# Patient Record
Sex: MALE | Race: BLACK OR AFRICAN AMERICAN | NOT HISPANIC OR LATINO | Employment: FULL TIME | URBAN - METROPOLITAN AREA
[De-identification: names, ages, dates, MRNs, and addresses within clinical notes are randomized per-mention and may not be internally consistent; named-entity substitution may affect disease eponyms.]

---

## 2017-11-26 ENCOUNTER — APPOINTMENT (EMERGENCY)
Dept: CT IMAGING | Facility: HOSPITAL | Age: 52
End: 2017-11-26
Payer: COMMERCIAL

## 2017-11-26 ENCOUNTER — HOSPITAL ENCOUNTER (EMERGENCY)
Facility: HOSPITAL | Age: 52
Discharge: HOME | End: 2017-11-26
Payer: COMMERCIAL

## 2017-11-26 ENCOUNTER — APPOINTMENT (EMERGENCY)
Dept: RADIOLOGY | Facility: HOSPITAL | Age: 52
End: 2017-11-26
Payer: COMMERCIAL

## 2017-11-26 VITALS
SYSTOLIC BLOOD PRESSURE: 126 MMHG | TEMPERATURE: 99.4 F | RESPIRATION RATE: 16 BRPM | OXYGEN SATURATION: 99 % | HEART RATE: 96 BPM | WEIGHT: 304.24 LBS | DIASTOLIC BLOOD PRESSURE: 60 MMHG

## 2017-11-26 DIAGNOSIS — R74.01 TRANSAMINITIS: ICD-10-CM

## 2017-11-26 DIAGNOSIS — M89.9 LYTIC LESION OF BONE ON X-RAY: ICD-10-CM

## 2017-11-26 DIAGNOSIS — J18.9 PNEUMONIA OF RIGHT LOWER LOBE DUE TO INFECTIOUS ORGANISM: Primary | ICD-10-CM

## 2017-11-26 LAB
ALBUMIN SERPL BCP-MCNC: 2.3 G/DL (ref 3.5–5)
ALP SERPL-CCNC: 122 U/L (ref 46–116)
ALT SERPL W P-5'-P-CCNC: 667 U/L (ref 12–78)
ANION GAP SERPL CALCULATED.3IONS-SCNC: 11 MMOL/L (ref 4–13)
AST SERPL W P-5'-P-CCNC: 480 U/L (ref 5–45)
ATRIAL RATE: 112 BPM
BACTERIA UR QL AUTO: ABNORMAL /HPF
BASOPHILS # BLD AUTO: 0.02 THOUSANDS/ΜL (ref 0–0.1)
BASOPHILS NFR BLD AUTO: 0 % (ref 0–1)
BILIRUB SERPL-MCNC: 1.7 MG/DL (ref 0.2–1)
BILIRUB UR QL STRIP: ABNORMAL
BUN SERPL-MCNC: 16 MG/DL (ref 5–25)
CALCIUM SERPL-MCNC: 8.6 MG/DL (ref 8.3–10.1)
CHLORIDE SERPL-SCNC: 99 MMOL/L (ref 100–108)
CLARITY UR: CLEAR
CO2 SERPL-SCNC: 24 MMOL/L (ref 21–32)
COLOR UR: ABNORMAL
CREAT SERPL-MCNC: 1.56 MG/DL (ref 0.6–1.3)
EOSINOPHIL # BLD AUTO: 0.01 THOUSAND/ΜL (ref 0–0.61)
EOSINOPHIL NFR BLD AUTO: 0 % (ref 0–6)
ERYTHROCYTE [DISTWIDTH] IN BLOOD BY AUTOMATED COUNT: 13.3 % (ref 11.6–15.1)
GFR SERPL CREATININE-BSD FRML MDRD: 58 ML/MIN/1.73SQ M
GLUCOSE SERPL-MCNC: 167 MG/DL (ref 65–140)
GLUCOSE UR STRIP-MCNC: NEGATIVE MG/DL
HCT VFR BLD AUTO: 33.5 % (ref 36.5–49.3)
HGB BLD-MCNC: 11.3 G/DL (ref 12–17)
HGB UR QL STRIP.AUTO: ABNORMAL
KETONES UR STRIP-MCNC: ABNORMAL MG/DL
LACTATE SERPL-SCNC: 1.3 MMOL/L (ref 0.5–2)
LEUKOCYTE ESTERASE UR QL STRIP: NEGATIVE
LYMPHOCYTES # BLD AUTO: 1.05 THOUSANDS/ΜL (ref 0.6–4.47)
LYMPHOCYTES NFR BLD AUTO: 8 % (ref 14–44)
MCH RBC QN AUTO: 30.2 PG (ref 26.8–34.3)
MCHC RBC AUTO-ENTMCNC: 33.7 G/DL (ref 31.4–37.4)
MCV RBC AUTO: 90 FL (ref 82–98)
MONOCYTES # BLD AUTO: 1.21 THOUSAND/ΜL (ref 0.17–1.22)
MONOCYTES NFR BLD AUTO: 9 % (ref 4–12)
NEUTROPHILS # BLD AUTO: 10.37 THOUSANDS/ΜL (ref 1.85–7.62)
NEUTS SEG NFR BLD AUTO: 80 % (ref 43–75)
NITRITE UR QL STRIP: NEGATIVE
NON-SQ EPI CELLS URNS QL MICRO: ABNORMAL /HPF
NRBC BLD AUTO-RTO: 0 /100 WBCS
P AXIS: 50 DEGREES
PH UR STRIP.AUTO: 6 [PH] (ref 4.5–8)
PLATELET # BLD AUTO: 192 THOUSANDS/UL (ref 149–390)
PMV BLD AUTO: 9.9 FL (ref 8.9–12.7)
POTASSIUM SERPL-SCNC: 3.5 MMOL/L (ref 3.5–5.3)
PR INTERVAL: 156 MS
PROT SERPL-MCNC: 7.1 G/DL (ref 6.4–8.2)
PROT UR STRIP-MCNC: >=300 MG/DL
QRS AXIS: 42 DEGREES
QRSD INTERVAL: 110 MS
QT INTERVAL: 342 MS
QTC INTERVAL: 466 MS
RBC # BLD AUTO: 3.74 MILLION/UL (ref 3.88–5.62)
RBC #/AREA URNS AUTO: ABNORMAL /HPF
SODIUM SERPL-SCNC: 134 MMOL/L (ref 136–145)
SP GR UR STRIP.AUTO: 1.02 (ref 1–1.03)
T WAVE AXIS: 60 DEGREES
TROPONIN I SERPL-MCNC: <0.02 NG/ML
UROBILINOGEN UR QL STRIP.AUTO: >=8 E.U./DL
VENTRICULAR RATE: 112 BPM
WBC # BLD AUTO: 12.94 THOUSAND/UL (ref 4.31–10.16)
WBC #/AREA URNS AUTO: ABNORMAL /HPF

## 2017-11-26 PROCEDURE — 96361 HYDRATE IV INFUSION ADD-ON: CPT

## 2017-11-26 PROCEDURE — 83605 ASSAY OF LACTIC ACID: CPT | Performed by: NURSE PRACTITIONER

## 2017-11-26 PROCEDURE — 71020 HB CHEST X-RAY 2VW FRONTAL&LATL: CPT

## 2017-11-26 PROCEDURE — 85025 COMPLETE CBC W/AUTO DIFF WBC: CPT

## 2017-11-26 PROCEDURE — 74177 CT ABD & PELVIS W/CONTRAST: CPT

## 2017-11-26 PROCEDURE — 84484 ASSAY OF TROPONIN QUANT: CPT

## 2017-11-26 PROCEDURE — 80053 COMPREHEN METABOLIC PANEL: CPT

## 2017-11-26 PROCEDURE — 71260 CT THORAX DX C+: CPT

## 2017-11-26 PROCEDURE — 81001 URINALYSIS AUTO W/SCOPE: CPT | Performed by: NURSE PRACTITIONER

## 2017-11-26 PROCEDURE — 93005 ELECTROCARDIOGRAM TRACING: CPT

## 2017-11-26 PROCEDURE — 36415 COLL VENOUS BLD VENIPUNCTURE: CPT

## 2017-11-26 PROCEDURE — 96374 THER/PROPH/DIAG INJ IV PUSH: CPT

## 2017-11-26 PROCEDURE — 99285 EMERGENCY DEPT VISIT HI MDM: CPT

## 2017-11-26 PROCEDURE — 96375 TX/PRO/DX INJ NEW DRUG ADDON: CPT

## 2017-11-26 RX ORDER — ACETAMINOPHEN 325 MG/1
975 TABLET ORAL ONCE
Status: COMPLETED | OUTPATIENT
Start: 2017-11-26 | End: 2017-11-26

## 2017-11-26 RX ORDER — MORPHINE SULFATE 4 MG/ML
4 INJECTION, SOLUTION INTRAMUSCULAR; INTRAVENOUS ONCE
Status: COMPLETED | OUTPATIENT
Start: 2017-11-26 | End: 2017-11-26

## 2017-11-26 RX ORDER — DOXYCYCLINE HYCLATE 100 MG/1
100 CAPSULE ORAL ONCE
Status: DISCONTINUED | OUTPATIENT
Start: 2017-11-26 | End: 2017-11-26 | Stop reason: HOSPADM

## 2017-11-26 RX ORDER — DOXYCYCLINE HYCLATE 100 MG/1
100 CAPSULE ORAL 2 TIMES DAILY
Qty: 20 CAPSULE | Refills: 0 | Status: SHIPPED | OUTPATIENT
Start: 2017-11-26 | End: 2017-12-06

## 2017-11-26 RX ORDER — ONDANSETRON 2 MG/ML
4 INJECTION INTRAMUSCULAR; INTRAVENOUS ONCE
Status: COMPLETED | OUTPATIENT
Start: 2017-11-26 | End: 2017-11-26

## 2017-11-26 RX ADMIN — MORPHINE SULFATE 4 MG: 4 INJECTION, SOLUTION INTRAMUSCULAR; INTRAVENOUS at 11:59

## 2017-11-26 RX ADMIN — SODIUM CHLORIDE 1000 ML: 0.9 INJECTION, SOLUTION INTRAVENOUS at 11:49

## 2017-11-26 RX ADMIN — ONDANSETRON 4 MG: 2 INJECTION INTRAMUSCULAR; INTRAVENOUS at 11:56

## 2017-11-26 RX ADMIN — IOHEXOL 100 ML: 350 INJECTION, SOLUTION INTRAVENOUS at 13:09

## 2017-11-26 RX ADMIN — ACETAMINOPHEN 975 MG: 325 TABLET ORAL at 11:52

## 2017-11-26 NOTE — ED PROVIDER NOTES
History  Chief Complaint   Patient presents with    Chest Pain     patient presents to the ED with c/o chest pain and cough x1 week      50-year-old male patient presenting to the emergency department with a chief complaint of cough for about a week  He is complaining of right upper abdominal pain and left upper abdominal pain  He is reporting nausea but no actual vomiting or diarrhea  Does not appear toxic  He is maintaining oral intake  None       Past Medical History:   Diagnosis Date    Asthma        History reviewed  No pertinent surgical history  History reviewed  No pertinent family history  I have reviewed and agree with the history as documented  Social History   Substance Use Topics    Smoking status: Former Smoker    Smokeless tobacco: Not on file    Alcohol use No        Review of Systems   Constitutional: Negative for diaphoresis, fatigue and fever  HENT: Negative for congestion, ear pain, nosebleeds and sore throat  Eyes: Negative for photophobia, pain, discharge and visual disturbance  Respiratory: Positive for cough  Negative for choking, chest tightness, shortness of breath and wheezing  Cardiovascular: Negative for chest pain and palpitations  Gastrointestinal: Positive for abdominal pain  Negative for abdominal distention, diarrhea and vomiting  Genitourinary: Negative for dysuria, flank pain and frequency  Musculoskeletal: Negative for back pain, gait problem and joint swelling  Skin: Negative for color change and rash  Neurological: Negative for dizziness, syncope and headaches  Psychiatric/Behavioral: Negative for behavioral problems and confusion  The patient is not nervous/anxious  All other systems reviewed and are negative        Physical Exam  ED Triage Vitals   Temperature Pulse Respirations Blood Pressure SpO2   11/26/17 1023 11/26/17 1018 11/26/17 1018 11/26/17 1018 11/26/17 1018   99 7 °F (37 6 °C) (!) 113 18 132/61 95 %      Temp Source Heart Rate Source Patient Position - Orthostatic VS BP Location FiO2 (%)   11/26/17 1333 11/26/17 1333 11/26/17 1333 11/26/17 1333 --   Oral Monitor Lying Right arm       Pain Score       11/26/17 1152       7           Orthostatic Vital Signs  Vitals:    11/26/17 1100 11/26/17 1245 11/26/17 1300 11/26/17 1333   BP:    126/60   Pulse: (!) 108 100 97 96   Patient Position - Orthostatic VS:    Lying       Physical Exam   Constitutional: He is oriented to person, place, and time  He appears well-developed and well-nourished  HENT:   Head: Normocephalic and atraumatic  Eyes: Pupils are equal, round, and reactive to light  Neck: Normal range of motion  Neck supple  Cardiovascular: Normal rate, regular rhythm, normal heart sounds and normal pulses  PMI is not displaced  Pulmonary/Chest: Effort normal  No apnea  No respiratory distress  He has no decreased breath sounds  He has rhonchi  Abdominal: Soft  He exhibits no distension  There is no guarding  Musculoskeletal: Normal range of motion  Lymphadenopathy:     He has no cervical adenopathy  Neurological: He is alert and oriented to person, place, and time  Skin: Skin is warm and dry  No rash noted  He is not diaphoretic  No pallor  Psychiatric: He has a normal mood and affect  Vitals reviewed        ED Medications  Medications   doxycycline hyclate (VIBRAMYCIN) capsule 100 mg (not administered)   sodium chloride 0 9 % bolus 1,000 mL (0 mL Intravenous Stopped 11/26/17 1333)   acetaminophen (TYLENOL) tablet 975 mg (975 mg Oral Given 11/26/17 1152)   morphine (PF) 4 mg/mL injection 4 mg (4 mg Intravenous Given 11/26/17 1159)   ondansetron (ZOFRAN) injection 4 mg (4 mg Intravenous Given 11/26/17 1156)   iohexol (OMNIPAQUE) 350 MG/ML injection (MULTI-DOSE) 100 mL (100 mL Intravenous Given 11/26/17 1309)       Diagnostic Studies  Results Reviewed     Procedure Component Value Units Date/Time    Urine Microscopic [[id number]]  (Abnormal) Collected: 11/26/17 1406    Lab Status:  Final result Specimen:  Urine from Urine, Clean Catch Updated:  11/26/17 1435     RBC, UA 10-20 (A) /hpf      WBC, UA 0-1 (A) /hpf      Epithelial Cells Occasional /hpf      Bacteria, UA Occasional /hpf     UA w Reflex to Microscopic w Reflex to Culture [[id number]]  (Abnormal) Collected:  11/26/17 1406    Lab Status:  Final result Specimen:  Urine from Urine, Clean Catch Updated:  11/26/17 1416     Color, UA Orange     Clarity, UA Clear     Specific Gravity, UA 1 025     pH, UA 6 0     Leukocytes, UA Negative     Nitrite, UA Negative     Protein, UA >=300 (A) mg/dl      Glucose, UA Negative mg/dl      Ketones, UA Trace (A) mg/dl      Urobilinogen, UA >=8 0 (A) E U /dl      Bilirubin, UA Moderate (A)     Blood, UA Moderate (A)    Lactic acid, plasma [[id number]]  (Normal) Collected:  11/26/17 1124    Lab Status:  Final result Specimen:  Blood from Arm, Right Updated:  11/26/17 1158     LACTIC ACID 1 3 mmol/L     Narrative:         Result may be elevated if tourniquet was used during collection  Troponin I [[id number]]  (Normal) Collected:  11/26/17 1125    Lab Status:  Final result Specimen:  Blood from Arm, Right Updated:  11/26/17 1152     Troponin I <0 02 ng/mL     Narrative:         [employer] Chemistry analyzer 99% cutoff is > 0 04 ng/mL in network labs    o cTnI 99% cutoff is useful only when applied to patients in the clinical setting of myocardial ischemia  o cTnI 99% cutoff should be interpreted in the context of clinical history, ECG findings and possibly cardiac imaging to establish correct diagnosis  o cTnI 99% cutoff may be suggestive but clearly not indicative of a coronary event without the clinical setting of myocardial ischemia      Comprehensive metabolic panel [[id number]]  (Abnormal) Collected:  11/26/17 1125    Lab Status:  Final result Specimen:  Blood from Arm, Right Updated:  11/26/17 1149     Sodium 134 (L) mmol/L      Potassium 3 5 mmol/L      Chloride 99 (L) mmol/L CO2 24 mmol/L      Anion Gap 11 mmol/L      BUN 16 mg/dL      Creatinine 1 56 (H) mg/dL      Glucose 167 (H) mg/dL      Calcium 8 6 mg/dL       (H) U/L       (H) U/L      Alkaline Phosphatase 122 (H) U/L      Total Protein 7 1 g/dL      Albumin 2 3 (L) g/dL      Total Bilirubin 1 70 (H) mg/dL      eGFR 58 ml/min/1 73sq m     Narrative:         National Kidney Disease Education Program recommendations are as follows:  GFR calculation is accurate only with a steady state creatinine  Chronic Kidney disease less than 60 ml/min/1 73 sq  meters  Kidney failure less than 15 ml/min/1 73 sq  meters  CBC and differential [[id number]]  (Abnormal) Collected:  11/26/17 1125    Lab Status:  Final result Specimen:  Blood from Arm, Right Updated:  11/26/17 1135     WBC 12 94 (H) Thousand/uL      RBC 3 74 (L) Million/uL      Hemoglobin 11 3 (L) g/dL      Hematocrit 33 5 (L) %      MCV 90 fL      MCH 30 2 pg      MCHC 33 7 g/dL      RDW 13 3 %      MPV 9 9 fL      Platelets 819 Thousands/uL      nRBC 0 /100 WBCs      Neutrophils Relative 80 (H) %      Lymphocytes Relative 8 (L) %      Monocytes Relative 9 %      Eosinophils Relative 0 %      Basophils Relative 0 %      Neutrophils Absolute 10 37 (H) Thousands/µL      Lymphocytes Absolute 1 05 Thousands/µL      Monocytes Absolute 1 21 Thousand/µL      Eosinophils Absolute 0 01 Thousand/µL      Basophils Absolute 0 02 Thousands/µL                  CT chest abdomen pelvis w contrast   Final Result by Mercedez Jones MD (11/26 8536)      No acute intra-abdominal pelvic abnormality identified  Patchy airspace infiltrate and consolidation throughout the RIGHT lower lobe most consistent with appearance of pneumonia  No pleural fluid collections  Reactive mediastinal and hilar adenopathy  Low suspicion lytic lesion within the RIGHT ischium  See comment                    Workstation performed: [id number]         X-ray chest 2 views   ED Interpretation by Mata Adam Holly Cooper (11/26 1106)   Right lower lobe pneumonia                 Procedures  Procedures       Phone Contacts  ED Phone Contact    ED Course  ED Course                                MDM  Number of Diagnoses or Management Options  Lytic lesion of bone on x-ray: new and requires workup  Pneumonia of right lower lobe due to infectious organism [facility]): new and requires workup  Transaminitis: new and requires workup  Diagnosis management comments: Will begin therapy for right lower lobe pneumonia  Recommend follow-up with PCP for transaminitis evaluation and repeat liver function studies  This may be the result of an current illness  CT of the abdomen was unremarkable for any liver or gallbladder disease  Additionally patient notified of his low suspicion lytic lesion over the ischium  Recommend follow-up with PCP for bone scan         Amount and/or Complexity of Data Reviewed  Clinical lab tests: reviewed and ordered  Tests in the radiology section of CPT®: reviewed and ordered  Tests in the medicine section of CPT®: reviewed and ordered  Independent visualization of images, tracings, or specimens: yes    Patient Progress  Patient progress: improved    CritCare Time    Disposition  Final diagnoses:   Pneumonia of right lower lobe due to infectious organism ([address] )   Transaminitis   Lytic lesion of bone on x-ray - [place]     Time reflects when diagnosis was documented in both MDM as applicable and the Disposition within this note     Time User Action Codes Description Comment    11/26/2017  2:05 PM Shanti Hein Add [J18 1] Pneumonia of right lower lobe due to infectious organism ([address] )     11/26/2017  2:05 PM Shanti Hein Add [R74 0] Transaminitis     11/26/2017  2:10 PM Shanti Gifford [M89 [id number]] Lytic lesion of bone on x-ray     11/26/2017  2:10 PM Tanya Youssef [M89 [id number]] Lytic lesion of bone on x-ray Is[place]      ED Disposition     ED Disposition Condition Comment    Discharge  [address] discharge to home/self care  Condition at discharge: Good        Follow-up Information     Follow up With Specialties Details Why Contact Info    America Hahn MD Internal Medicine Schedule an appointment as soon as possible for a visit For Continued Evaluation [address] [place] [address]  [phone]          Patient's Medications   Discharge Prescriptions    DOXYCYCLINE HYCLATE (VIBRAMYCIN) 100 MG CAPSULE    Take 1 capsule by mouth 2 (two) times a day for 10 days       Start Date: 11/26/2017End Date: 12/6/2017       Order Dose: 100 mg       Quantity: 20 capsule    Refills: 0    HYDROCODONE-HOMATROPINE (HYCODAN) 5-1 5 MG/5 ML SYRUP    Take 5 mL by mouth 3 (three) times a day as needed for cough for up to 5 days Max Daily Amount: 15 mL       Start Date: 11/26/2017End Date: 12/1/2017       Order Dose: 5 mL       Quantity: 120 mL    Refills: 0     No discharge procedures on file      ED Provider  Electronically Signed by           VENUS Hinds  11/26/17 7933

## 2017-11-26 NOTE — DISCHARGE INSTRUCTIONS
Community Acquired Pneumonia   WHAT YOU NEED TO KNOW:   Community-acquired pneumonia (CAP) is a lung infection that you get outside of a hospital or nursing home setting  Your lungs become inflamed and cannot work well  CAP may be caused by bacteria, viruses, or fungi  DISCHARGE INSTRUCTIONS:   Return to the emergency department if:   · You are confused and cannot think clearly  · You have increased trouble breathing  · Your lips or fingernails turn gray or blue  Contact your healthcare provider if:   · Your symptoms do not get better, or they get worse  · You are urinating less, or not at all  · You have questions or concerns about your condition or care  Medicines:   · Medicines  may be given to treat a bacterial, viral, or fungal infection  You may also be given medicines to dilate your bronchial tubes to help you breathe more easily  · Take your medicine as directed  Contact your healthcare provider if you think your medicine is not helping or if you have side effects  Tell him or her if you are allergic to any medicine  Keep a list of the medicines, vitamins, and herbs you take  Include the amounts, and when and why you take them  Bring the list or the pill bottles to follow-up visits  Carry your medicine list with you in case of an emergency  Follow up with your healthcare provider within 3 days or as directed: You may need another x-ray  Write down your questions so you remember to ask them during your visits  Deep breathing and coughing:  Deep breathing helps open the air passages in your lungs  Coughing helps bring up mucus from your lungs  Take a deep breath and hold the breath as long as you can  Then push the air out of your lungs with a deep, strong cough  Spit out any mucus you have coughed up  Take 10 deep breaths in a row every hour that you are awake  Remember to follow each deep breath with a cough     Do not smoke or allow others to smoke around you:  Nicotine and other chemicals in cigarettes and cigars can cause lung damage  Ask your healthcare provider for information if you currently smoke and need help to quit  E-cigarettes or smokeless tobacco still contain nicotine  Talk to your healthcare provider before you use these products  Manage CAP at home:   · Breathe warm, moist air  This helps loosen mucus  Loosely place a warm, wet washcloth over your nose and mouth  A room humidifier may also help make the air moist     · Drink liquids as directed  Ask your healthcare provider how much liquid to drink each day and which liquids to drink  Liquids help make mucus thin and easier to get out of your body  · Gently tap your chest   This helps loosen mucus so it is easier to cough  Lie with your head lower than your chest several times a day and tap your chest      · Get plenty of rest   Rest helps your body heal   Prevent CAP:   · Wash your hands often with soap and water  Carry germ-killing hand gel with you  You can use the gel to clean your hands when soap and water are not available  Do not touch your eyes, nose, or mouth unless you have washed your hands first      · Clean surfaces often  Clean doorknobs, countertops, cell phones, and other surfaces that are touched often  · Always cover your mouth when you cough  Cough into a tissue or your shirtsleeve so you do not spread germs from your hands  · Try to avoid people who have a cold or the flu  If you are sick, stay away from others as much as possible  · Ask about vaccines  You may need a vaccine to help prevent pneumonia  Get an influenza (flu) vaccine every year as soon as it becomes available  © 2017 [facility] Information is for End User's use only and may not be sold, redistributed or otherwise used for commercial purposes  All illustrations and images included in CareNotes® are the copyrighted property of [employer]  or [employer]    The above information is an  only  It is not intended as medical advice for individual conditions or treatments  Talk to your doctor, nurse or pharmacist before following any medical regimen to see if it is safe and effective for you